# Patient Record
Sex: MALE | Race: WHITE | NOT HISPANIC OR LATINO | Employment: OTHER | RURAL
[De-identification: names, ages, dates, MRNs, and addresses within clinical notes are randomized per-mention and may not be internally consistent; named-entity substitution may affect disease eponyms.]

---

## 2023-07-28 DIAGNOSIS — M25.561 RIGHT KNEE PAIN, UNSPECIFIED CHRONICITY: Primary | ICD-10-CM

## 2023-07-31 ENCOUNTER — HOSPITAL ENCOUNTER (OUTPATIENT)
Dept: RADIOLOGY | Facility: HOSPITAL | Age: 53
Discharge: HOME | End: 2023-07-31
Attending: ORTHOPAEDIC SURGERY
Payer: OTHER GOVERNMENT

## 2023-07-31 ENCOUNTER — OFFICE VISIT (OUTPATIENT)
Dept: ORTHOPEDICS | Facility: CLINIC | Age: 53
End: 2023-07-31
Payer: OTHER GOVERNMENT

## 2023-07-31 DIAGNOSIS — M25.561 RIGHT KNEE PAIN, UNSPECIFIED CHRONICITY: ICD-10-CM

## 2023-07-31 DIAGNOSIS — S83.241A ACUTE MEDIAL MENISCUS TEAR OF RIGHT KNEE, INITIAL ENCOUNTER: Primary | ICD-10-CM

## 2023-07-31 PROCEDURE — 99204 OFFICE O/P NEW MOD 45 MIN: CPT | Mod: S$PBB,,, | Performed by: ORTHOPAEDIC SURGERY

## 2023-07-31 PROCEDURE — 73562 X-RAY EXAM OF KNEE 3: CPT | Mod: TC,RT

## 2023-07-31 PROCEDURE — 73562 XR KNEE AP LAT WITH SUNRISE RIGHT: ICD-10-PCS | Mod: 26,RT,, | Performed by: ORTHOPAEDIC SURGERY

## 2023-07-31 PROCEDURE — 73562 X-RAY EXAM OF KNEE 3: CPT | Mod: 26,RT,, | Performed by: ORTHOPAEDIC SURGERY

## 2023-07-31 PROCEDURE — 99204 PR OFFICE/OUTPT VISIT, NEW, LEVL IV, 45-59 MIN: ICD-10-PCS | Mod: S$PBB,,, | Performed by: ORTHOPAEDIC SURGERY

## 2023-07-31 PROCEDURE — 99213 OFFICE O/P EST LOW 20 MIN: CPT | Mod: PBBFAC | Performed by: ORTHOPAEDIC SURGERY

## 2023-07-31 RX ORDER — TRIAMTERENE CAPSULES 100 MG/1
100 CAPSULE ORAL DAILY
COMMUNITY

## 2023-07-31 RX ORDER — OMEPRAZOLE 20 MG/1
20 CAPSULE, DELAYED RELEASE ORAL DAILY
COMMUNITY

## 2023-07-31 RX ORDER — NAPROXEN 250 MG/1
250 TABLET ORAL 2 TIMES DAILY
COMMUNITY

## 2023-07-31 RX ORDER — GABAPENTIN 100 MG/1
1600 CAPSULE ORAL 3 TIMES DAILY
COMMUNITY

## 2023-07-31 RX ORDER — LAMOTRIGINE 100 MG/1
100 TABLET ORAL 2 TIMES DAILY
COMMUNITY

## 2023-07-31 RX ORDER — PRAVASTATIN SODIUM 20 MG/1
20 TABLET ORAL DAILY
COMMUNITY

## 2023-07-31 NOTE — PROGRESS NOTES
CLINIC NOTE       Chief Complaint   Patient presents with    Right Knee - Pain        Talon Pickering is a 53 y.o. male seen today for evaluation of right knee pain.  She is known to me having undergone right knee arthroscopy in October of 2019.  He was found to have grade 2-3/4 DJD involving the patellar articular surface and grade 2-3/4 DJD involving the medial femoral condyle articular surface.  Degenerative tear of the posterior horn of the medial meniscus was excised.  He underwent a repeat MRI examination of the right knee on 07/19/2023.  The findings were compared with study performed 05/24/2019 preoperatively.  MRI suggested additional tearing of the midbody region of the medial meniscus with extrusion.  There was some thickening of the anterior cruciate ligament consistent with a sprain or partial tear.  He had a perceived recent injury in July of this year.  He was weeding on slanted embankment.  He turned and felt sudden sharp pain and experienced a pop in his right knee.  He is had a predominance of medial knee pain.  He underwent MRI examination of the right knee 05/24/2020      Past Medical History:   Diagnosis Date    Essential (primary) hypertension     High cholesterol      No family history on file.  Current Outpatient Medications on File Prior to Visit   Medication Sig Dispense Refill    gabapentin (NEURONTIN) 100 MG capsule Take 300 mg by mouth 3 (three) times daily.      lamoTRIgine (LAMICTAL) 100 MG tablet Take 100 mg by mouth.      naproxen (NAPROSYN) 250 MG tablet Take 250 mg by mouth.      omeprazole (PRILOSEC) 20 MG capsule Take 20 mg by mouth.      pravastatin (PRAVACHOL) 20 MG tablet Take 20 mg by mouth.      triamterene (DYRENIUM) 100 MG Cap Take 100 mg by mouth 2 (two) times daily.       No current facility-administered medications on file prior to visit.       ROS     There were no vitals filed for this visit.    No past surgical history on file.     Review of patient's allergies  indicates:   Allergen Reactions    Codeine Shortness Of Breath        Ortho Exam :  Well-developed well-nourished male no acute distress.  Alert oriented cooperative.  Neck is supple without JVD.  Breathing is regular nonlabored.  Skin is warm dry no lesions seen.  There is mild effusion.  Lachman exam is negative.  There is no medial or lateral ligamentous instability appreciated.  There is tenderness palpation along the medial joint line region.  Gemini test produces pain over the medial aspect of the joint.    Radiographic Examination:  Right knee 07/31/2023    Technique:  Three views AP lateral and patellar    Findings:  Bones well mineralized.  There is mild narrowing of the medial joint space.  No evidence of fracture, dislocation or pathologic bone.  Impression:   See Above    Assessment and Plan  There are no problems to display for this patient.   Impression:  Medial meniscal tear right knee superimposed on mild to moderate medial compartment DJD.  Plan:  Patient is offered right knee arthroscopy.  Potential benefits and risks of surgery outlined to include but not limited to bleeding, infection, damage to blood vessels nerves, need for further surgery, other risks and complications including even death and the patient wished to proceed.  Limitations of arthroscopy relative to potential DJD were emphasized.    Vimal Deleon M.D.

## 2023-08-01 DIAGNOSIS — S83.241A ACUTE MEDIAL MENISCUS TEAR OF RIGHT KNEE, INITIAL ENCOUNTER: Primary | ICD-10-CM

## 2023-08-01 DIAGNOSIS — Z01.812 PRE-PROCEDURE LAB EXAM: ICD-10-CM

## 2023-08-01 DIAGNOSIS — Z01.811 PREOP RESPIRATORY EXAM: ICD-10-CM

## 2023-08-01 DIAGNOSIS — Z01.810 PREOP CARDIOVASCULAR EXAM: ICD-10-CM

## 2023-08-10 ENCOUNTER — OFFICE VISIT (OUTPATIENT)
Dept: FAMILY MEDICINE | Facility: CLINIC | Age: 53
End: 2023-08-10
Payer: OTHER GOVERNMENT

## 2023-08-10 ENCOUNTER — HOSPITAL ENCOUNTER (OUTPATIENT)
Dept: RADIOLOGY | Facility: HOSPITAL | Age: 53
Discharge: HOME | End: 2023-08-10
Attending: ORTHOPAEDIC SURGERY
Payer: OTHER GOVERNMENT

## 2023-08-10 VITALS
TEMPERATURE: 98 F | HEART RATE: 78 BPM | HEIGHT: 68 IN | OXYGEN SATURATION: 98 % | RESPIRATION RATE: 16 BRPM | WEIGHT: 206.81 LBS | BODY MASS INDEX: 31.34 KG/M2 | SYSTOLIC BLOOD PRESSURE: 134 MMHG | DIASTOLIC BLOOD PRESSURE: 88 MMHG

## 2023-08-10 DIAGNOSIS — G40.909 SEIZURE DISORDER: Chronic | ICD-10-CM

## 2023-08-10 DIAGNOSIS — Z01.818 PREOPERATIVE CLEARANCE: Primary | ICD-10-CM

## 2023-08-10 DIAGNOSIS — I10 ESSENTIAL HYPERTENSION: Chronic | ICD-10-CM

## 2023-08-10 DIAGNOSIS — K21.9 GASTROESOPHAGEAL REFLUX DISEASE WITHOUT ESOPHAGITIS: Chronic | ICD-10-CM

## 2023-08-10 DIAGNOSIS — Z01.811 PREOP RESPIRATORY EXAM: ICD-10-CM

## 2023-08-10 DIAGNOSIS — E78.2 MIXED HYPERLIPIDEMIA: Chronic | ICD-10-CM

## 2023-08-10 DIAGNOSIS — Z01.812 PRE-PROCEDURE LAB EXAM: ICD-10-CM

## 2023-08-10 LAB
ANION GAP SERPL CALCULATED.3IONS-SCNC: 9 MMOL/L (ref 7–16)
BASOPHILS # BLD AUTO: 0.06 K/UL (ref 0–0.2)
BASOPHILS NFR BLD AUTO: 0.8 % (ref 0–1)
BUN SERPL-MCNC: 22 MG/DL (ref 7–18)
BUN/CREAT SERPL: 17 (ref 6–20)
CALCIUM SERPL-MCNC: 9.6 MG/DL (ref 8.5–10.1)
CHLORIDE SERPL-SCNC: 109 MMOL/L (ref 98–107)
CO2 SERPL-SCNC: 27 MMOL/L (ref 21–32)
CREAT SERPL-MCNC: 1.33 MG/DL (ref 0.7–1.3)
DIFFERENTIAL METHOD BLD: ABNORMAL
EGFR (NO RACE VARIABLE) (RUSH/TITUS): 64 ML/MIN/1.73M2
EOSINOPHIL # BLD AUTO: 0.16 K/UL (ref 0–0.5)
EOSINOPHIL NFR BLD AUTO: 2 % (ref 1–4)
ERYTHROCYTE [DISTWIDTH] IN BLOOD BY AUTOMATED COUNT: 13.9 % (ref 11.5–14.5)
GLUCOSE SERPL-MCNC: 92 MG/DL (ref 74–106)
HCT VFR BLD AUTO: 47 % (ref 40–54)
HGB BLD-MCNC: 14.9 G/DL (ref 13.5–18)
IMM GRANULOCYTES # BLD AUTO: 0.03 K/UL (ref 0–0.04)
IMM GRANULOCYTES NFR BLD: 0.4 % (ref 0–0.4)
LYMPHOCYTES # BLD AUTO: 1.75 K/UL (ref 1–4.8)
LYMPHOCYTES NFR BLD AUTO: 22.2 % (ref 27–41)
MCH RBC QN AUTO: 30.3 PG (ref 27–31)
MCHC RBC AUTO-ENTMCNC: 31.7 G/DL (ref 32–36)
MCV RBC AUTO: 95.5 FL (ref 80–96)
MONOCYTES # BLD AUTO: 0.83 K/UL (ref 0–0.8)
MONOCYTES NFR BLD AUTO: 10.5 % (ref 2–6)
MPC BLD CALC-MCNC: 10.2 FL (ref 9.4–12.4)
NEUTROPHILS # BLD AUTO: 5.04 K/UL (ref 1.8–7.7)
NEUTROPHILS NFR BLD AUTO: 64.1 % (ref 53–65)
NRBC # BLD AUTO: 0 X10E3/UL
NRBC, AUTO (.00): 0 %
PLATELET # BLD AUTO: 332 K/UL (ref 150–400)
POTASSIUM SERPL-SCNC: 4.4 MMOL/L (ref 3.5–5.1)
RBC # BLD AUTO: 4.92 M/UL (ref 4.6–6.2)
SODIUM SERPL-SCNC: 141 MMOL/L (ref 136–145)
WBC # BLD AUTO: 7.87 K/UL (ref 4.5–11)

## 2023-08-10 PROCEDURE — 85025 CBC WITH DIFFERENTIAL: ICD-10-PCS | Mod: ,,, | Performed by: CLINICAL MEDICAL LABORATORY

## 2023-08-10 PROCEDURE — 93000 PR ELECTROCARDIOGRAM, COMPLETE: ICD-10-PCS | Mod: ,,, | Performed by: FAMILY MEDICINE

## 2023-08-10 PROCEDURE — 93000 ELECTROCARDIOGRAM COMPLETE: CPT | Mod: ,,, | Performed by: FAMILY MEDICINE

## 2023-08-10 PROCEDURE — 71046 X-RAY EXAM CHEST 2 VIEWS: CPT | Mod: TC,PN

## 2023-08-10 PROCEDURE — 99204 PR OFFICE/OUTPT VISIT, NEW, LEVL IV, 45-59 MIN: ICD-10-PCS | Mod: ,,, | Performed by: FAMILY MEDICINE

## 2023-08-10 PROCEDURE — 80048 BASIC METABOLIC PANEL: ICD-10-PCS | Mod: ,,, | Performed by: CLINICAL MEDICAL LABORATORY

## 2023-08-10 PROCEDURE — 99204 OFFICE O/P NEW MOD 45 MIN: CPT | Mod: ,,, | Performed by: FAMILY MEDICINE

## 2023-08-10 PROCEDURE — 85025 COMPLETE CBC W/AUTO DIFF WBC: CPT | Mod: ,,, | Performed by: CLINICAL MEDICAL LABORATORY

## 2023-08-10 PROCEDURE — 80048 BASIC METABOLIC PNL TOTAL CA: CPT | Mod: ,,, | Performed by: CLINICAL MEDICAL LABORATORY

## 2023-08-10 NOTE — PROGRESS NOTES
Nitin Baez MD    [address] [place] [address]     PATIENT NAME: Talon Pickering  : 1970  DATE: 8/10/23  MRN: [id number]      Billing Provider: Nitin Baez MD  Level of Service: AR OFFICE/OUTPT VISIT, NEWCARMEN IV, 45-59 MIN  Patient PCP Information       Provider PCP Type    Primary Doctor No General            Reason for Visit / Chief Complaint: Pre-op Exam       Update PCP  Update Chief Complaint         History of Present Illness / Problem Focused Workflow     Talon Pickering presents to the clinic with Pre-op Exam     Dr. Kaplan is doing a right knee, arthroscopic procedure for chronic problems with his right knee.     Mr. Pickering is new to [facility] clinic[facility]      HPI    Review of Systems     Review of Systems   Constitutional:  Negative for activity change, appetite change, chills, fatigue and fever.   HENT:  Negative for nasal congestion, ear pain, hearing loss, postnasal drip and sore throat.    Respiratory:  Negative for cough, chest tightness, shortness of breath and wheezing.    Cardiovascular:  Negative for chest pain, palpitations, leg swelling and claudication.   Gastrointestinal:  Negative for abdominal pain, change in bowel habit, constipation, diarrhea, nausea, vomiting and change in bowel habit.   Genitourinary:  Negative for dysuria.   Musculoskeletal:  Positive for arthralgias and gait problem. Negative for back pain and myalgias.   Integumentary:  Negative for rash.   Neurological:  Negative for weakness and headaches.   Psychiatric/Behavioral:  Negative for suicidal ideas. The patient is not nervous/anxious.         Medical / Social / Family History     Past Medical History:   Diagnosis Date    Essential (primary) hypertension     High cholesterol        History reviewed. No pertinent surgical history.    Social History    reports that he has been smoking cigarettes. He started smoking about 5  months ago. He has a 0.2 pack-year smoking history. He has never used smokeless tobacco. He reports current drug use. Drug: Marijuana. He reports that he does not drink alcohol.    Family History  's family history is not on file.    Medications and Allergies     Medications  Outpatient Medications Marked as Taking for the 8/10/23 encounter (Office Visit) with Nitin Baez MD   Medication Sig Dispense Refill    gabapentin (NEURONTIN) 100 MG capsule Take 300 mg by mouth 3 (three) times daily.      lamoTRIgine (LAMICTAL) 100 MG tablet Take 100 mg by mouth.      naproxen (NAPROSYN) 250 MG tablet Take 250 mg by mouth.      omeprazole (PRILOSEC) 20 MG capsule Take 20 mg by mouth.      pravastatin (PRAVACHOL) 20 MG tablet Take 20 mg by mouth.      triamterene (DYRENIUM) 100 MG Cap Take 100 mg by mouth 2 (two) times daily.         Allergies  Review of patient's allergies indicates:   Allergen Reactions    Codeine Shortness Of Breath       Physical Examination     Vitals:    08/10/23 0927   BP: 134/88   Pulse: 78   Resp: 16   Temp: 98.1 °F (36.7 °C)     Physical Exam  Vitals and nursing note reviewed.   Constitutional:       General: He is not in acute distress.     Appearance: Normal appearance. He is not ill-appearing.   Eyes:      Extraocular Movements: Extraocular movements intact.      Pupils: Pupils are equal, round, and reactive to light.   Cardiovascular:      Rate and Rhythm: Normal rate and regular rhythm.      Heart sounds: Normal heart sounds.   Pulmonary:      Effort: Pulmonary effort is normal.      Breath sounds: Normal breath sounds.   Abdominal:      General: Bowel sounds are normal.      Palpations: Abdomen is soft.   Musculoskeletal:         General: Normal range of motion.   Skin:     Findings: No rash.   Neurological:      General: No focal deficit present.      Mental Status: He is alert and oriented to person, place, and time. Mental status is at baseline.   Psychiatric:         Mood and  Affect: Mood normal.         Behavior: Behavior normal.            Assessment and Plan (including Health Maintenance)      Problem List  Smart Sets  Document Outside HM   :    Plan:     11 previous surgeries, has never had problems with anesthesia.     Most recent surgery was October 2021.     No history of CAD, but he does have a strong family history of heart disease.     Patient does have seizure disorder, last seizure was approximately 18 months ago. No history of surgery or anesthesia causing his seizures to get worse.     History of TMI, and fractured back.       He is cleared for surgery.       Health Maintenance Due   Topic Date Due    Hepatitis C Screening  Never done    Lipid Panel  Never done    COVID-19 Vaccine (1) Never done    Pneumococcal Vaccines (Age 0-64) (1 - PCV) Never done    HIV Screening  Never done    TETANUS VACCINE  Never done    Hemoglobin A1c (Diabetic Prevention Screening)  Never done    Colorectal Cancer Screening  Never done    Shingles Vaccine (1 of 2) Never done       Problem List Items Addressed This Visit          Neuro    Seizure disorder (Chronic)       Cardiac/Vascular    Mixed hyperlipidemia (Chronic)    Essential hypertension (Chronic)       GI    Gastroesophageal reflux disease without esophagitis (Chronic)     Other Visit Diagnoses       Preoperative clearance    -  Primary    Preop respiratory exam        Pre-procedure lab exam        Relevant Orders    POCT EKG 12-LEAD (Manually Resulted by Ordering Provider)            Health Maintenance Topics with due status: Not Due       Topic Last Completion Date    Influenza Vaccine 01/17/2020       Procedures     No future appointments.       Follow up if symptoms worsen or fail to improve.     Signature:  Nitin Baez MD  [address] [place] [address]  Phone #: [phone]  Fax #: [phone]    Date of encounter: 8/10/23    There are no Patient Instructions on file for this  visit.

## 2023-08-15 ENCOUNTER — ANESTHESIA (OUTPATIENT)
Dept: SURGERY | Facility: HOSPITAL | Age: 53
End: 2023-08-15
Payer: OTHER GOVERNMENT

## 2023-08-15 ENCOUNTER — HOSPITAL ENCOUNTER (OUTPATIENT)
Facility: HOSPITAL | Age: 53
Discharge: HOME | End: 2023-08-15
Attending: ORTHOPAEDIC SURGERY | Admitting: ORTHOPAEDIC SURGERY
Payer: OTHER GOVERNMENT

## 2023-08-15 ENCOUNTER — ANESTHESIA EVENT (OUTPATIENT)
Dept: SURGERY | Facility: HOSPITAL | Age: 53
End: 2023-08-15
Payer: OTHER GOVERNMENT

## 2023-08-15 VITALS
SYSTOLIC BLOOD PRESSURE: 130 MMHG | RESPIRATION RATE: 16 BRPM | TEMPERATURE: 98 F | WEIGHT: 207.81 LBS | OXYGEN SATURATION: 91 % | BODY MASS INDEX: 31.49 KG/M2 | DIASTOLIC BLOOD PRESSURE: 74 MMHG | HEIGHT: 68 IN | HEART RATE: 73 BPM

## 2023-08-15 DIAGNOSIS — S83.241A ACUTE MEDIAL MENISCUS TEAR OF RIGHT KNEE: Primary | ICD-10-CM

## 2023-08-15 PROCEDURE — 63600175 PHARM REV CODE 636 W HCPCS: Performed by: NURSE ANESTHETIST, CERTIFIED REGISTERED

## 2023-08-15 PROCEDURE — D9220A PRA ANESTHESIA: Mod: ANES,,, | Performed by: ANESTHESIOLOGY

## 2023-08-15 PROCEDURE — 27000655: Performed by: ANESTHESIOLOGY

## 2023-08-15 PROCEDURE — 37000009 HC ANESTHESIA EA ADD 15 MINS: Performed by: ORTHOPAEDIC SURGERY

## 2023-08-15 PROCEDURE — D9220A PRA ANESTHESIA: Mod: CRNA,,, | Performed by: NURSE ANESTHETIST, CERTIFIED REGISTERED

## 2023-08-15 PROCEDURE — 01400 ANES OPN/ARTHRS KNEE JT NOS: CPT | Performed by: ORTHOPAEDIC SURGERY

## 2023-08-15 PROCEDURE — 25000003 PHARM REV CODE 250: Performed by: NURSE ANESTHETIST, CERTIFIED REGISTERED

## 2023-08-15 PROCEDURE — 36000710: Performed by: ORTHOPAEDIC SURGERY

## 2023-08-15 PROCEDURE — 27000716 HC OXISENSOR PROBE, ANY SIZE: Performed by: ANESTHESIOLOGY

## 2023-08-15 PROCEDURE — 27000284 HC CANNULA NASAL: Performed by: ANESTHESIOLOGY

## 2023-08-15 PROCEDURE — 27201423 OPTIME MED/SURG SUP & DEVICES STERILE SUPPLY: Performed by: ORTHOPAEDIC SURGERY

## 2023-08-15 PROCEDURE — 97161 PT EVAL LOW COMPLEX 20 MIN: CPT

## 2023-08-15 PROCEDURE — 27000510 HC BLANKET BAIR HUGGER ANY SIZE: Performed by: ANESTHESIOLOGY

## 2023-08-15 PROCEDURE — 71000015 HC POSTOP RECOV 1ST HR: Performed by: ORTHOPAEDIC SURGERY

## 2023-08-15 PROCEDURE — 71000033 HC RECOVERY, INTIAL HOUR: Performed by: ORTHOPAEDIC SURGERY

## 2023-08-15 PROCEDURE — 27000177 HC AIRWAY, LARYNGEAL MASK: Performed by: ANESTHESIOLOGY

## 2023-08-15 PROCEDURE — 29881 PR KNEE SCOPE SINGLE MENISECECTOMY: ICD-10-PCS | Mod: RT,,, | Performed by: ORTHOPAEDIC SURGERY

## 2023-08-15 PROCEDURE — 36000711: Performed by: ORTHOPAEDIC SURGERY

## 2023-08-15 PROCEDURE — D9220A PRA ANESTHESIA: ICD-10-PCS | Mod: CRNA,,, | Performed by: NURSE ANESTHETIST, CERTIFIED REGISTERED

## 2023-08-15 PROCEDURE — 29881 ARTHRS KNE SRG MNISECTMY M/L: CPT | Mod: RT,,, | Performed by: ORTHOPAEDIC SURGERY

## 2023-08-15 PROCEDURE — D9220A PRA ANESTHESIA: ICD-10-PCS | Mod: ANES,,, | Performed by: ANESTHESIOLOGY

## 2023-08-15 PROCEDURE — 37000008 HC ANESTHESIA 1ST 15 MINUTES: Performed by: ORTHOPAEDIC SURGERY

## 2023-08-15 PROCEDURE — 25000003 PHARM REV CODE 250: Performed by: ORTHOPAEDIC SURGERY

## 2023-08-15 RX ORDER — PROMETHAZINE HYDROCHLORIDE 25 MG/1
25 TABLET ORAL EVERY 6 HOURS PRN
Status: DISCONTINUED | OUTPATIENT
Start: 2023-08-15 | End: 2023-08-15 | Stop reason: HOSPADM

## 2023-08-15 RX ORDER — KETOROLAC TROMETHAMINE 30 MG/ML
INJECTION, SOLUTION INTRAMUSCULAR; INTRAVENOUS
Status: DISCONTINUED | OUTPATIENT
Start: 2023-08-15 | End: 2023-08-15

## 2023-08-15 RX ORDER — LIDOCAINE HYDROCHLORIDE 20 MG/ML
INJECTION, SOLUTION EPIDURAL; INFILTRATION; INTRACAUDAL; PERINEURAL
Status: DISCONTINUED | OUTPATIENT
Start: 2023-08-15 | End: 2023-08-15

## 2023-08-15 RX ORDER — ONDANSETRON 4 MG/1
8 TABLET, ORALLY DISINTEGRATING ORAL EVERY 8 HOURS PRN
Status: DISCONTINUED | OUTPATIENT
Start: 2023-08-15 | End: 2023-08-15 | Stop reason: HOSPADM

## 2023-08-15 RX ORDER — MORPHINE SULFATE 10 MG/ML
4 INJECTION INTRAMUSCULAR; INTRAVENOUS; SUBCUTANEOUS EVERY 5 MIN PRN
Status: DISCONTINUED | OUTPATIENT
Start: 2023-08-15 | End: 2023-08-15 | Stop reason: HOSPADM

## 2023-08-15 RX ORDER — ONDANSETRON 2 MG/ML
4 INJECTION INTRAMUSCULAR; INTRAVENOUS DAILY PRN
Status: DISCONTINUED | OUTPATIENT
Start: 2023-08-15 | End: 2023-08-15 | Stop reason: HOSPADM

## 2023-08-15 RX ORDER — HYDROMORPHONE HYDROCHLORIDE 2 MG/ML
0.5 INJECTION, SOLUTION INTRAMUSCULAR; INTRAVENOUS; SUBCUTANEOUS EVERY 5 MIN PRN
Status: DISCONTINUED | OUTPATIENT
Start: 2023-08-15 | End: 2023-08-15 | Stop reason: HOSPADM

## 2023-08-15 RX ORDER — DEXAMETHASONE SODIUM PHOSPHATE 4 MG/ML
INJECTION, SOLUTION INTRA-ARTICULAR; INTRALESIONAL; INTRAMUSCULAR; INTRAVENOUS; SOFT TISSUE
Status: DISCONTINUED | OUTPATIENT
Start: 2023-08-15 | End: 2023-08-15

## 2023-08-15 RX ORDER — HYDROCODONE BITARTRATE AND ACETAMINOPHEN 10; 325 MG/1; MG/1
1 TABLET ORAL EVERY 4 HOURS PRN
Status: DISCONTINUED | OUTPATIENT
Start: 2023-08-15 | End: 2023-08-15 | Stop reason: HOSPADM

## 2023-08-15 RX ORDER — ONDANSETRON 2 MG/ML
INJECTION INTRAMUSCULAR; INTRAVENOUS
Status: DISCONTINUED | OUTPATIENT
Start: 2023-08-15 | End: 2023-08-15

## 2023-08-15 RX ORDER — PROPOFOL 10 MG/ML
INJECTION, EMULSION INTRAVENOUS
Status: DISCONTINUED | OUTPATIENT
Start: 2023-08-15 | End: 2023-08-15

## 2023-08-15 RX ORDER — ACETAMINOPHEN 500 MG
1000 TABLET ORAL EVERY 6 HOURS PRN
Status: DISCONTINUED | OUTPATIENT
Start: 2023-08-15 | End: 2023-08-15 | Stop reason: HOSPADM

## 2023-08-15 RX ORDER — VERAPAMIL HYDROCHLORIDE 100 MG/1
100 CAPSULE, EXTENDED RELEASE ORAL DAILY
COMMUNITY

## 2023-08-15 RX ORDER — SODIUM CHLORIDE 9 MG/ML
INJECTION, SOLUTION INTRAVENOUS CONTINUOUS
Status: DISPENSED | OUTPATIENT
Start: 2023-08-15

## 2023-08-15 RX ORDER — HYDROCODONE BITARTRATE AND ACETAMINOPHEN 5; 325 MG/1; MG/1
1 TABLET ORAL EVERY 4 HOURS PRN
Status: DISCONTINUED | OUTPATIENT
Start: 2023-08-15 | End: 2023-08-15 | Stop reason: HOSPADM

## 2023-08-15 RX ORDER — FENTANYL CITRATE 50 UG/ML
INJECTION, SOLUTION INTRAMUSCULAR; INTRAVENOUS
Status: DISCONTINUED | OUTPATIENT
Start: 2023-08-15 | End: 2023-08-15

## 2023-08-15 RX ORDER — MEPERIDINE HYDROCHLORIDE 25 MG/ML
25 INJECTION INTRAMUSCULAR; INTRAVENOUS; SUBCUTANEOUS EVERY 10 MIN PRN
Status: DISCONTINUED | OUTPATIENT
Start: 2023-08-15 | End: 2023-08-15 | Stop reason: HOSPADM

## 2023-08-15 RX ORDER — MIDAZOLAM HYDROCHLORIDE 1 MG/ML
INJECTION INTRAMUSCULAR; INTRAVENOUS
Status: DISCONTINUED | OUTPATIENT
Start: 2023-08-15 | End: 2023-08-15

## 2023-08-15 RX ORDER — DIPHENHYDRAMINE HYDROCHLORIDE 50 MG/ML
25 INJECTION INTRAMUSCULAR; INTRAVENOUS EVERY 6 HOURS PRN
Status: DISCONTINUED | OUTPATIENT
Start: 2023-08-15 | End: 2023-08-15 | Stop reason: HOSPADM

## 2023-08-15 RX ORDER — PNV NO.95/FERROUS FUM/FOLIC AC 28MG-0.8MG
100 TABLET ORAL DAILY
COMMUNITY

## 2023-08-15 RX ADMIN — MIDAZOLAM HYDROCHLORIDE 2 MG: 1 INJECTION, SOLUTION INTRAMUSCULAR; INTRAVENOUS at 03:08

## 2023-08-15 RX ADMIN — FENTANYL CITRATE 50 MCG: 50 INJECTION INTRAMUSCULAR; INTRAVENOUS at 04:08

## 2023-08-15 RX ADMIN — SODIUM CHLORIDE: 9 INJECTION, SOLUTION INTRAVENOUS at 03:08

## 2023-08-15 RX ADMIN — KETOROLAC TROMETHAMINE 30 MG: 30 INJECTION, SOLUTION INTRAMUSCULAR at 04:08

## 2023-08-15 RX ADMIN — PROPOFOL 80 MG: 10 INJECTION, EMULSION INTRAVENOUS at 04:08

## 2023-08-15 RX ADMIN — PROPOFOL 50 MG: 10 INJECTION, EMULSION INTRAVENOUS at 04:08

## 2023-08-15 RX ADMIN — LIDOCAINE HYDROCHLORIDE 80 MG: 20 INJECTION, SOLUTION INTRAVENOUS at 04:08

## 2023-08-15 RX ADMIN — SODIUM CHLORIDE: 9 INJECTION, SOLUTION INTRAVENOUS at 04:08

## 2023-08-15 RX ADMIN — FENTANYL CITRATE 50 MCG: 50 INJECTION INTRAMUSCULAR; INTRAVENOUS at 03:08

## 2023-08-15 RX ADMIN — CEFAZOLIN 2 G: 1 INJECTION, POWDER, FOR SOLUTION INTRAMUSCULAR; INTRAVENOUS; PARENTERAL at 04:08

## 2023-08-15 RX ADMIN — PROPOFOL 70 MG: 10 INJECTION, EMULSION INTRAVENOUS at 04:08

## 2023-08-15 RX ADMIN — PROPOFOL 200 MG: 10 INJECTION, EMULSION INTRAVENOUS at 03:08

## 2023-08-15 RX ADMIN — LIDOCAINE HYDROCHLORIDE 60 MG: 20 INJECTION, SOLUTION INTRAVENOUS at 03:08

## 2023-08-15 RX ADMIN — DEXAMETHASONE SODIUM PHOSPHATE 8 MG: 4 INJECTION, SOLUTION INTRA-ARTICULAR; INTRALESIONAL; INTRAMUSCULAR; INTRAVENOUS; SOFT TISSUE at 04:08

## 2023-08-15 RX ADMIN — ONDANSETRON 4 MG: 2 INJECTION INTRAMUSCULAR; INTRAVENOUS at 04:08

## 2023-08-15 NOTE — ANESTHESIA PREPROCEDURE EVALUATION
08/15/2023  Talon Pickering is a 53 y.o., male.      Pre-op Assessment    I have reviewed the Patient Summary Reports.    I have reviewed the NPO Status.   I have reviewed the Medications.     Review of Systems         Anesthesia Plan  Type of Anesthesia, risks & benefits discussed:    Anesthesia Type: Gen Supraglottic Airway  Intra-op Monitoring Plan: Standard ASA Monitors  Post Op Pain Control Plan: IV/PO Opioids PRN  Induction:  IV  Informed Consent: Informed consent signed with the Patient and all parties understand the risks and agree with anesthesia plan.  All questions answered.   ASA Score: 3    Ready For Surgery From Anesthesia Perspective.     .  NPO greater than 8 hours  NAC  Allergic to codeine    Hct 47  Cr 1.3    Medical History   Essential (primary) hypertension High cholesterol   Seizures    ANDRES (CPAP)    MP II; full beard; adequate ROM at neck

## 2023-08-15 NOTE — PT/OT/SLP EVAL
Physical Therapy Evaluation    Patient Name:  Talon Pickering   MRN:  [id number]    Recommendations:     Discharge Recommendations: home   Discharge Equipment Recommendations: none   Barriers to discharge: None    Assessment:     Talon Pickering is a 53 y.o. male admitted with a medical diagnosis of Acute medial meniscus tear of right knee.  He presents with the following impairments/functional limitations:   Patient with good understanding of post op education.    Rehab Prognosis: Good; patient would benefit from acute skilled PT services to address these deficits and reach maximum level of function.    Recent Surgery: Procedure(s) (LRB):  ARTHROSCOPY, KNEE (Right)  ARTHROSCOPY, KNEE, WITH PARTIAL MEDIAL MENISCECTOMY (Right)  ARTHROSCOPY, KNEE, WITH DEBRIDEMENT OF MEDIAL FEMORAL CONDYLE (Right) Day of Surgery    Plan:     During this hospitalization, patient to be seen 1 x/week to address the identified rehab impairments via gait training, therapeutic activities and progress toward the following goals:    Plan of Care Expires:  08/15/23    Subjective     Chief Complaint: post op pain  Patient/Family Comments/goals: plan is dc home today  Pain/Comfort:  Pain Rating 1: 4/10  Location - Side 1: Right  Location 1: knee    Patients cultural, spiritual, Baptism conflicts given the current situation:      Living Environment:  Lives with family  Prior to admission, patients level of function was independetn.  Equipment used at home: crutches.  DME owned (not currently used): none.  Upon discharge, patient will have assistance from family.    Objective:     Communicated with nruse prior to session.  Patient found supine with    upon PT entry to room.    General Precautions: Standard, fall  Orthopedic Precautions:RLE partial weight bearing   Braces:    Respiratory Status: Room air    Exams:  na    Functional Mobility:  Ambulated 20 feet with crutches pwb      AM-PAC 6 CLICK MOBILITY  Total Score:24       Treatment &  Education:  Hep ascop protocol    Patient left supine with call button in reach.    GOALS:   Multidisciplinary Problems       Physical Therapy Goals       Not on file                    History:     Past Medical History:   Diagnosis Date    Essential (primary) hypertension     High cholesterol     Seizures        Past Surgical History:   Procedure Laterality Date    BACK SURGERY      CARPAL TUNNEL RELEASE Left     CERVICAL FUSION      CERVICAL LAMINECTOMY N/A     KNEE ARTHROSCOPY Right     SHOULDER ARTHROSCOPY Left        Time Tracking:     PT Received On: 08/15/23  PT Start Time: 1800     PT Stop Time: 1815  PT Total Time (min): 15 min     Billable Minutes: Evaluation 15      08/15/2023

## 2023-08-15 NOTE — OP NOTE
[facility] - Orthopedic Periop Services  Surgery Department  Operative Note    SUMMARY     Date of Procedure: 8/15/2023     Procedure: Procedure(s) (LRB):  ARTHROSCOPY, KNEE (Right)  ARTHROSCOPY, KNEE, WITH PARTIAL MEDIAL MENISCECTOMY (Right)  ARTHROSCOPY, KNEE, WITH DEBRIDEMENT OF MEDIAL FEMORAL CONDYLE (Right)     Surgeon(s) and Role:     * Vimal Deleon MD - Primary    Assisting Surgeon: None    Pre-Operative Diagnosis: Acute medial meniscus tear of right knee, initial encounter [S83.241A]    Post-Operative Diagnosis: Post-Op Diagnosis Codes:     * Acute medial meniscus tear of right knee, initial encounter [S83.241A]    Anesthesia: general     Technical Procedures Used:  Right knee arthroscopy           DEPARTMENT OF ORTHOPEDIC SURGERY                OPERATIVE REPORT     NAME:  Talon Pickering  MRN: [id number]               DATE OF SURGERY:  08/15/2023     PREOPERATIVE DIAGNOSIS:  right knee internal derangement    POSTOPERATIVE DIAGNOSIS:  Grade 2/4 DJD patellar articular surface, grade 2-3/4 DJD medial femoral condyle articular surface, complex tear posterior horn medial meniscus-right knee    ANESTHESIA:  General.    PROCEDURE:  Examination under anesthesia, arthroscopy with intraarticular probing, shaving medial femoral condyle, partial medial meniscectomy    PROCEDURE IN DETAIL:  The patient was taken to the operating room and placed in the supine position.  After adequate level of general anesthesia had been achieved (See Anesthesia Note) patients right knee was examined.  There was 1+ intraarticular effusion.  Knee range of motion was 0-130 degrees of flexion.  Lachman exam was negative as is the FRD.  There is no medial or ligamentous instability appreciated.  Gemini test produced intermittent popping felt to emanate from the lateral aspect of the joint.  Now, the rightlower extremity was scrubbed with Betadine and draped in sterile fashion.  The right lower extremity was elevated,  exsanguinated with a Thong bandage.  A pneumatic tourniquet about the upper thigh, to pressure of 300 millimeters of Mercury.  The operation was begun by making a small stab wound about the superolateral aspect of the right patella.  A trocar was introduced into the suprapatellar pouch and the knee was distended with sterile saline.  Second and third stab wounds were made about the medial and lateral aspects of the patellar tendon for introduction of the arthroscopy camera and intraarticular probe.  Now a systematic evaluation of the knee was carried out.  Exam of the suprapatellar pouch was unremarkable.  Exam of the patellofemoral joint showed grade 2/4 DJD involving the patellar articular surface.  The patella otherwise tracked well in the midline.  Lateral gutter was unremarkable with no loose bodies encountered.  Exam of the medial joint demonstrated grade 2-3/4 DJD involving weight-bearing articular surface of the medial femoral condyle.  This was lightly debrided with a shaver complex degenerative tear of the posterior horn of the medial meniscus was present.  The torn portion of meniscus was excised and the edges were smoothed with a shaver and contoured with the radiofrequency Wand.  Examiner condylar notch revealed intact anterior and posterior cruciate ligaments.  Exam lateral joint showed normal femoral tibial cartilage and intact lateral meniscus to probing.   Now, the tourniquet was let down.  Hemostasis was achieved with the radiofrequency Wand.  Knee joint was irrigated copiously with antibiotic solution and arthroscope withdrawn.  The stab wounds were reapproximated with interrupted 4-0 suture.  The wounds were dressed sterilely.  The patient was taken to the recovery room in satisfactory condition.  ESTIMATED BLOOD LOSS:  5ccs.   Tourniquet time: 20 minutes.  The patient received Ancef antibiotic intravenously prior to the procedure.      Vimal Deleon         Complications: No    Estimated  Blood Loss (EBL): 5 mL           Implants: * No implants in log *    Specimens:   Specimen (24h ago, onward)      None                    Condition: Good    Disposition: PACU - hemodynamically stable.    Attestation: I was present and scrubbed for the entire procedure.

## 2023-08-15 NOTE — OR NURSING
PT TO OR3 PER STRETCHER. TRANSFERRED TO OR TABLE- DISCOVERED TO HAVE NIPPLE PIERCINGS X2 AND PIERCING INSIDE EAR X1. PT STATES HE WAS TOLD PRE OP TO REMOVE ANY PIERCINGS. TOLD PT PIERCINGS WOULD HAVE TO COME OUT. HE STATES HE WOULD RATHER CX SURGERY THAN HAVE THEM CUT. PT AGREED TO LET ME TRY TO REMOVE THEM ALTHOUGH HE SAID THEY HAD TO BE REMOVED. BY . I WAS ABLE TO REMOVE ALL THREE PIERCINGS WITHOUT CUTTING METAL. I TOLD PT I WOULD TRY TO PUT THEM BACK IN POST OP. IF I WAS NOT ABLE TO, THEY WOULD BE IN A BAG WAITING FOR HIM.

## 2023-08-15 NOTE — ANESTHESIA PROCEDURE NOTES
Intubation    Date/Time: 8/15/2023 3:57 PM    Performed by: Ja Quezada Jr., CRNA  Authorized by: Martinez Centeno MD    Intubation:     Induction:  Intravenous    Intubated:  Postinduction    Mask Ventilation:  Not attempted    Attempts:  1    Attempted By:  CRNA    Method of Intubation:  Direct    Difficult Airway Encountered?: No      Complications:  None    Airway Device:  Supraglottic airway/LMA    Airway Device Size:  5.0    Style/Cuff Inflation:  Cuffed (inflated to minimal occlusive pressure)    Secured at:  The lips    Placement Verified By:  Capnometry    Complicating Factors:  None    Findings Post-Intubation:  Atraumatic/condition of teeth unchanged

## 2023-08-15 NOTE — SUBJECTIVE & OBJECTIVE
Past Medical History:   Diagnosis Date    Essential (primary) hypertension     High cholesterol        History reviewed. No pertinent surgical history.    Review of patient's allergies indicates:   Allergen Reactions    Codeine Shortness Of Breath       No current facility-administered medications for this encounter.     Current Outpatient Medications   Medication Sig    gabapentin (NEURONTIN) 100 MG capsule Take 300 mg by mouth 3 (three) times daily.    lamoTRIgine (LAMICTAL) 100 MG tablet Take 100 mg by mouth.    naproxen (NAPROSYN) 250 MG tablet Take 250 mg by mouth.    omeprazole (PRILOSEC) 20 MG capsule Take 20 mg by mouth.    pravastatin (PRAVACHOL) 20 MG tablet Take 20 mg by mouth.    triamterene (DYRENIUM) 100 MG Cap Take 100 mg by mouth 2 (two) times daily.     Family History    None       Tobacco Use    Smoking status: Some Days     Current packs/day: 0.50     Average packs/day: 0.5 packs/day for 0.5 years (0.2 ttl pk-yrs)     Types: Cigarettes     Start date: 03/2023    Smokeless tobacco: Never   Substance and Sexual Activity    Alcohol use: Never    Drug use: Yes     Types: Marijuana    Sexual activity: Not on file     Review of Systems   Constitutional: Negative.     Objective:     Vital Signs (Most Recent):    Vital Signs (24h Range):  BP: ()/()   Arterial Line BP: ()/()            There is no height or weight on file to calculate BMI.    No intake or output data in the 24 hours ending 08/14/23 2208     General    Constitutional: He is oriented to person, place, and time. He appears well-developed and well-nourished.   HENT:   Head: Normocephalic and atraumatic.   Eyes: EOM are normal. Pupils are equal, round, and reactive to light.   Neck: Neck supple.   Cardiovascular:  Normal rate, regular rhythm and normal heart sounds.            Pulmonary/Chest: Effort normal and breath sounds normal.   Abdominal: Soft. Bowel sounds are normal.   Neurological: He is alert and oriented to person, place, and time.    Psychiatric: He has a normal mood and affect. His behavior is normal.           Right Knee Exam     Inspection   Effusion: present    Tenderness   The patient is tender to palpation of the medial joint line.    Range of Motion   The patient has normal right knee ROM.    Tests   Meniscus   Gemini:  Medial - positive   Ligament Examination   Lachman: normal (-1 to 2mm)   PCL-Posterior Drawer: normal (0 to 2mm)     MCL - Valgus: normal (0 to 2mm)  LCL - Varus: normal  Pivot Shift: normal (Equal)    Other   Sensation: normal    Left Knee Exam   Left knee exam is normal.    Vascular Exam     Right Pulses  Dorsalis Pedis:      2+  Posterior Tibial:      2+           Significant Labs: All pertinent labs within the past 24 hours have been reviewed.    Significant Imaging: I have reviewed all pertinent imaging results/findings.

## 2023-08-15 NOTE — DISCHARGE SUMMARY
"[facility] - Orthopedic Periop Services  Discharge Note  Short Stay    Procedure(s) (LRB):  ARTHROSCOPY, KNEE (Right)  ARTHROSCOPY, KNEE, WITH PARTIAL MEDIAL MENISCECTOMY (Right)  ARTHROSCOPY, KNEE, WITH DEBRIDEMENT OF MEDIAL FEMORAL CONDYLE (Right)      OUTCOME: Patient tolerated treatment/procedure well without complication and is now ready for discharge.    DISPOSITION: Home or Self Care    FINAL DIAGNOSIS:  Acute medial meniscus tear of right knee    FOLLOWUP: In clinic    DISCHARGE INSTRUCTIONS:    Discharge Procedure Orders   CRUTCHES FOR HOME USE     Order Specific Question Answer Comments   Type: Axillary    Height: 5' 8" (1.727 m)    Weight: 94.3 kg (207 lb 12.8 oz)    Length of need (1-99 months): 2      Diet general     Keep surgical extremity elevated     Ice to affected area   Order Comments: using barrier between ice and skin (specify duration&frequency)     Change dressing (specify)   Order Comments: Dressing change: one time per day beginning 72 hours post op.     Call MD for:  temperature >100.4     Call MD for:  persistent nausea and vomiting     Call MD for:  severe uncontrolled pain     Call MD for:  difficulty breathing, headache or visual disturbances     Call MD for:  redness, tenderness, or signs of infection (pain, swelling, redness, odor or green/yellow discharge around incision site)     Call MD for:  hives     Call MD for:  persistent dizziness or light-headedness     Call MD for:  extreme fatigue     Remove dressing in 48 hours     Activity as tolerated     Shower on day dressing removed (No bath)     Weight bearing as tolerated         Clinical Reference Documents Added to Patient Instructions         Document    KNEE ARTHROSCOPY DISCHARGE INSTRUCTIONS (ENGLISH)    [facility] AV[facility] FACTSHEET            TIME SPENT ON DISCHARGE: 15 minutes  "

## 2023-08-15 NOTE — BRIEF OP NOTE
"[facility] - Orthopedic Periop Services  Brief Operative Note    Surgery Date: 8/15/2023     Surgeon(s) and Role:     * Vimal Deleon MD - Primary    Assisting Surgeon: None    Pre-op Diagnosis:  Acute medial meniscus tear of right knee, initial encounter [S83.241A]    Post-op Diagnosis:  Post-Op Diagnosis Codes:     * Acute medial meniscus tear of right knee, initial encounter [S83.241A]    Procedure(s) (LRB):  ARTHROSCOPY, KNEE (Right)  ARTHROSCOPY, KNEE, WITH PARTIAL MEDIAL MENISCECTOMY (Right)  ARTHROSCOPY, KNEE, WITH DEBRIDEMENT OF MEDIAL FEMORAL CONDYLE (Right)    Anesthesia: general    Description of the findings of the procedure(s): See Op Note     Estimated Blood Loss: 5 mL         Specimens:   Specimen (24h ago, onward)      None              Discharge Note    OUTCOME: Patient tolerated treatment/procedure well without complication and is now ready for discharge.    DISPOSITION: Home or Self Care    FINAL DIAGNOSIS:  Acute medial meniscus tear of right knee    FOLLOWUP: In clinic    DISCHARGE INSTRUCTIONS:    Discharge Procedure Orders   CRUTCHES FOR HOME USE     Order Specific Question Answer Comments   Type: Axillary    Height: 5' 8" (1.727 m)    Weight: 94.3 kg (207 lb 12.8 oz)    Length of need (1-99 months): 2      Diet general     Keep surgical extremity elevated     Ice to affected area   Order Comments: using barrier between ice and skin (specify duration&frequency)     Change dressing (specify)   Order Comments: Dressing change: one time per day beginning 72 hours post op.     Call MD for:  temperature >100.4     Call MD for:  persistent nausea and vomiting     Call MD for:  severe uncontrolled pain     Call MD for:  difficulty breathing, headache or visual disturbances     Call MD for:  redness, tenderness, or signs of infection (pain, swelling, redness, odor or green/yellow discharge around incision site)     Call MD for:  hives     Call MD for:  persistent dizziness or " light-headedness     Call MD for:  extreme fatigue     Remove dressing in 48 hours     Activity as tolerated     Shower on day dressing removed (No bath)     Weight bearing as tolerated        Clinical Reference Documents Added to Patient Instructions         Document    KNEE ARTHROSCOPY DISCHARGE INSTRUCTIONS (ENGLISH)    [facility]IOID AVS FACTSHEET

## 2023-08-15 NOTE — H&P
[facility] - Orthopedic Periop [facility]  Orthopedics  H&P    Patient Name: Talon Pickering  MRN: [id number]  Admission Date: (Not on file)  Primary Care Provider: Catherine, Primary Doctor    Patient information was obtained from patient and ER records.     Subjective:     Principal Problem:Acute medial meniscus tear of right knee    Chief Complaint: No chief complaint on file.       HPI: Chief Complaint:Right knee pain  HPI: Talon Pickering is a 53 y.o. male seen for evaluation of right knee pain.  He is known to me having undergone right knee arthroscopy in October of 2019.  He was found to have grade 2-3/4 DJD involving the patellar articular surface and grade 2-3/4 DJD involving the medial femoral condyle articular surface.  Degenerative tear of the posterior horn of the medial meniscus was excised.  He underwent a repeat MRI examination of the right knee on 07/19/2023.  The findings were compared with study performed 05/24/2019 preoperatively.  MRI suggested additional tearing of the midbody region of the medial meniscus with extrusion.  There was some thickening of the anterior cruciate ligament consistent with a sprain or partial tear.  He had a perceived recent injury in July of this year.  He was weeding on slanted embankment.  He turned and felt sudden sharp pain and experienced a pop in his right knee.  He is had a predominance of medial knee pain.   Impression:Medial meniscal tear-Right knee  Plan:Right knee Arthroscopy, outpatient        No new subjective & objective note has been filed under this hospital service since the last note was generated.    Assessment/Plan:     No notes have been filed under this hospital service.  Service: Orthopedic Surgery      Vimal Deleon MD  Orthopedics  [facility] - [facility]opedic [facility]

## 2023-08-15 NOTE — HPI
Chief Complaint:Right knee pain  HPI: Talon Pickering is a 53 y.o. male seen for evaluation of right knee pain.  He is known to me having undergone right knee arthroscopy in October of 2019.  He was found to have grade 2-3/4 DJD involving the patellar articular surface and grade 2-3/4 DJD involving the medial femoral condyle articular surface.  Degenerative tear of the posterior horn of the medial meniscus was excised.  He underwent a repeat MRI examination of the right knee on 07/19/2023.  The findings were compared with study performed 05/24/2019 preoperatively.  MRI suggested additional tearing of the midbody region of the medial meniscus with extrusion.  There was some thickening of the anterior cruciate ligament consistent with a sprain or partial tear.  He had a perceived recent injury in July of this year.  He was weeding on slanted embankment.  He turned and felt sudden sharp pain and experienced a pop in his right knee.  He is had a predominance of medial knee pain.   Impression:Medial meniscal tear-Right knee  Plan:Right knee Arthroscopy, outpatient

## 2023-08-15 NOTE — OR NURSING
1709  Patient transferred to pacu in stable condtion with o2 @ 2l nc.    1725 o2 removed.     Transferred patient to OP room 15 in stable condition. BS report to Arlette RN @ 7032.  VSS @ 127/75, 98%RA, 73HR and 21 RR.  Friend at BS.

## 2023-08-15 NOTE — H&P
[facility] - Orth[facility] Periop Services  Orthopedics  H&P    Patient Name: Talon Pickering  MRN: [id number]  Admission Date: (Not on file)  Primary Care Provider: Catherine, Primary Doctor    Patient information was obtained from patient and ER records.     Subjective:     Principal Problem:Acute medial meniscus tear of right knee    Chief Complaint: No chief complaint on file.       HPI: Chief Complaint:Right knee pain  HPI: Talon Pickering is a 53 y.o. male seen for evaluation of right knee pain.  He is known to me having undergone right knee arthroscopy in October of 2019.  He was found to have grade 2-3/4 DJD involving the patellar articular surface and grade 2-3/4 DJD involving the medial femoral condyle articular surface.  Degenerative tear of the posterior horn of the medial meniscus was excised.  He underwent a repeat MRI examination of the right knee on 07/19/2023.  The findings were compared with study performed 05/24/2019 preoperatively.  MRI suggested additional tearing of the midbody region of the medial meniscus with extrusion.  There was some thickening of the anterior cruciate ligament consistent with a sprain or partial tear.  He had a perceived recent injury in July of this year.  He was weeding on slanted embankment.  He turned and felt sudden sharp pain and experienced a pop in his right knee.  He is had a predominance of medial knee pain.   Impression:Medial meniscal tear-Right knee  Plan:Right knee Arthroscopy, outpatient        Past Medical History:   Diagnosis Date    Essential (primary) hypertension     High cholesterol        History reviewed. No pertinent surgical history.    Review of patient's allergies indicates:   Allergen Reactions    Codeine Shortness Of Breath       No current facility-administered medications for this encounter.     Current Outpatient Medications   Medication Sig    gabapentin (NEURONTIN) 100 MG capsule Take 300 mg by mouth 3 (three) times daily.    lamoTRIgine  (LAMICTAL) 100 MG tablet Take 100 mg by mouth.    naproxen (NAPROSYN) 250 MG tablet Take 250 mg by mouth.    omeprazole (PRILOSEC) 20 MG capsule Take 20 mg by mouth.    pravastatin (PRAVACHOL) 20 MG tablet Take 20 mg by mouth.    triamterene (DYRENIUM) 100 MG Cap Take 100 mg by mouth 2 (two) times daily.     Family History    None       Tobacco Use    Smoking status: Some Days     Current packs/day: 0.50     Average packs/day: 0.5 packs/day for 0.5 years (0.2 ttl pk-yrs)     Types: Cigarettes     Start date: 03/2023    Smokeless tobacco: Never   Substance and Sexual Activity    Alcohol use: Never    Drug use: Yes     Types: Marijuana    Sexual activity: Not on file     Review of Systems   Constitutional: Negative.     Objective:     Vital Signs (Most Recent):    Vital Signs (24h Range):  BP: ()/()   Arterial Line BP: ()/()            There is no height or weight on file to calculate BMI.    No intake or output data in the 24 hours ending 08/14/23 2208     General    Constitutional: He is oriented to person, place, and time. He appears well-developed and well-nourished.   HENT:   Head: Normocephalic and atraumatic.   Eyes: EOM are normal. Pupils are equal, round, and reactive to light.   Neck: Neck supple.   Cardiovascular:  Normal rate, regular rhythm and normal heart sounds.            Pulmonary/Chest: Effort normal and breath sounds normal.   Abdominal: Soft. Bowel sounds are normal.   Neurological: He is alert and oriented to person, place, and time.   Psychiatric: He has a normal mood and affect. His behavior is normal.           Right Knee Exam     Inspection   Effusion: present    Tenderness   The patient is tender to palpation of the medial joint line.    Range of Motion   The patient has normal right knee ROM.    Tests   Meniscus   Gemini:  Medial - positive   Ligament Examination   Lachman: normal (-1 to 2mm)   PCL-Posterior Drawer: normal (0 to 2mm)     MCL - Valgus: normal (0 to 2mm)  LCL -  Varus: normal  Pivot Shift: normal (Equal)    Other   Sensation: normal    Left Knee Exam   Left knee exam is normal.    Vascular Exam     Right Pulses  Dorsalis Pedis:      2+  Posterior Tibial:      2+           Significant Labs: All pertinent labs within the past 24 hours have been reviewed.    Significant Imaging: I have reviewed all pertinent imaging results/findings.    Assessment/Plan:     No notes have been filed under this hospital service.  Service: Orthopedic Surgery      Vimal Deleon MD  Orthopedics  [facility]

## 2023-08-15 NOTE — INTERVAL H&P NOTE
The patient has been examined and the H&P has been reviewed:    I concur with the findings and no changes have occurred since H&P was written.    Surgery risks, benefits and alternative options discussed and understood by patient/family.          Active Hospital Problems    Diagnosis  POA    *Acute medial meniscus tear of right knee [S82.888A]  Yes      Resolved Hospital Problems   No resolved problems to display.

## 2023-08-16 NOTE — ANESTHESIA POSTPROCEDURE EVALUATION
Anesthesia Post Evaluation    Patient: Talon Pickering    Procedure(s) Performed: Procedure(s) (LRB):  ARTHROSCOPY, KNEE (Right)  ARTHROSCOPY, KNEE, WITH PARTIAL MEDIAL MENISCECTOMY (Right)  ARTHROSCOPY, KNEE, WITH DEBRIDEMENT OF MEDIAL FEMORAL CONDYLE (Right)    Final Anesthesia Type: general      Patient location during evaluation: PACU  Post-procedure vital signs: reviewed and stable  Pain management: adequate  Airway patency: patent    PONV status at discharge: No PONV  Anesthetic complications: no      Cardiovascular status: hemodynamically stable  Respiratory status: unassisted  Hydration status: euvolemic  Follow-up not needed.          Vitals Value Taken Time   /74 08/15/23 1801   Temp 36.7 °C (98.1 °F) 08/15/23 1710   Pulse 71 08/15/23 1804   Resp 16 08/15/23 1800   SpO2 91 % 08/15/23 1804   Vitals shown include unvalidated device data.      Event Time   Out of Recovery 17:40:00         Pain/Chip Score: Chip Score: 10 (8/15/2023  5:45 PM)

## 2023-08-24 ENCOUNTER — OFFICE VISIT (OUTPATIENT)
Dept: ORTHOPEDICS | Facility: CLINIC | Age: 53
End: 2023-08-24
Payer: OTHER GOVERNMENT

## 2023-08-24 VITALS
TEMPERATURE: 98 F | HEIGHT: 68 IN | HEART RATE: 76 BPM | RESPIRATION RATE: 16 BRPM | OXYGEN SATURATION: 99 % | WEIGHT: 207 LBS | BODY MASS INDEX: 31.37 KG/M2

## 2023-08-24 DIAGNOSIS — M17.11 PRIMARY OSTEOARTHRITIS OF RIGHT KNEE: ICD-10-CM

## 2023-08-24 DIAGNOSIS — Z98.890 STATUS POST ARTHROSCOPY OF RIGHT KNEE: Primary | ICD-10-CM

## 2023-08-24 PROCEDURE — 99214 OFFICE O/P EST MOD 30 MIN: CPT | Mod: PBBFAC | Performed by: NURSE PRACTITIONER

## 2023-08-24 PROCEDURE — 99024 POSTOP FOLLOW-UP VISIT: CPT | Mod: ,,, | Performed by: NURSE PRACTITIONER

## 2023-08-24 PROCEDURE — 99024 PR POST-OP FOLLOW-UP VISIT: ICD-10-PCS | Mod: ,,, | Performed by: NURSE PRACTITIONER

## 2023-08-24 RX ORDER — DICLOFENAC SODIUM 10 MG/G
4 GEL TOPICAL EVERY 6 HOURS PRN
Qty: 450 G | Refills: 6 | Status: SHIPPED | OUTPATIENT
Start: 2023-08-24

## 2023-08-24 NOTE — PROGRESS NOTES
53-year-old male patient presents ambulatory orthopedic clinic valuation right knee.  He is known to orthopedic having undergone right knee arthroscopy on 08/15/2023.  He underwent partial meniscectomy of the posterior horn of the medial meniscus with debridement of the medial femoral condyle.  He was noted during the procedure have grade 2-4 DJD patellofemoral articular surface and grade 2 to 3/4 DJD of the medial femoral condyle articular surface with a complex tear of the posterior horn of the medial meniscus.  He reports improvement symptoms.  Denies need for further pain medication.      PE: He is noted be ambulating independently no perceptible limp.  Physical exam right knee skin is warm and dry.  Portal sites are noted.  Portal sites healing well without sign or symptom of infection.  They are open to air.  Sutures noted.  Minimal intra-articular effusion is appreciated clinically.    Impression:  1.)  9 days following right knee arthroscopy 2.)  Mild to moderate DJD knee-right     Plan:  Safety guidelines and activity restrictions are discussed with the patient.  He verbalizes understanding.  He does take naproxen sodium for arthritic pain.  He was pleased with his medication.  We will add topical Voltaren gel 2% to his right knee Q 6 hours as needed.  [facility] per patient request.  We discussed intra-articular steroid injections.  He understands he must be at least 4 weeks from surgery prior to having the injection.  He also understands must be at least 4 months since last injection.  He understands that if he does receive an injection in chose to have a total knee replacement arthroplasty he would have to wait 4 months from the time injection.  We discussed viscosupplementation.  He understands the series of 3 shots 1 every 7-10 days.  Sutures are removed Steri-Strips applied.  We will have return to orthopedic clinic in 3 weeks follow-up Dr. Deleon.  If he  feels he was doing well and does not need to be seen that day he may call and cancel that appointment.

## 2023-08-24 NOTE — PATIENT INSTRUCTIONS
Safety guidelines and activity restrictions are discussed with the patient.  He verbalizes understanding.  He does take naproxen sodium for arthritic pain.  He was pleased with his medication.  We will add topical Voltaren gel 2% to his right knee Q 6 hours as needed.  [facility] per patient request.  We discussed intra-articular steroid injections.  He understands he must be at least 4 weeks from surgery prior to having the injection.  He also understands must be at least 4 months since last injection.  He understands that if he does receive an injection in chose to have a total knee replacement arthroplasty he would have to wait 4 months from the time injection.  We discussed viscosupplementation.  He understands the series of 3 shots 1 every 7-10 days.  Sutures are removed Steri-Strips applied.  We will have return to orthopedic clinic in 3 weeks follow-up Dr. Deleon.  If he feels he was doing well and does not need to be seen that day he may call and cancel that appointment.

## (undated) DEVICE — PAD ABDOMINAL 8X7.5 STERILE

## (undated) DEVICE — GOWN POLY REINF BRTH SLV XL

## (undated) DEVICE — SOL NACL IRR 3000ML

## (undated) DEVICE — TUBING CROSSFLOW INFLOW CASS

## (undated) DEVICE — SHAVER TOMCAT 4.0

## (undated) DEVICE — PACK KNEE ARTHROSCOPY  RUSH

## (undated) DEVICE — GLOVE 6.0 PROTEXIS PI BLUE

## (undated) DEVICE — BANDAGE ACE DOUBLE STER 6IN

## (undated) DEVICE — DEVICE SUCTION H20 BROOM 12FT

## (undated) DEVICE — PROBE AARDVARK SUC 0.5X135MM

## (undated) DEVICE — TOURNIQUET SB QC SP 34X4IN

## (undated) DEVICE — CANISTER SUCTION MEDI-VAC 12L

## (undated) DEVICE — GLOVE BIOGEL SKINSENSE PI 7.5

## (undated) DEVICE — APPLICATOR CHLORAPREP ORN 26ML

## (undated) DEVICE — UNDERGLOVE BIOGEL PI SZ 5.5